# Patient Record
Sex: MALE | Race: WHITE
[De-identification: names, ages, dates, MRNs, and addresses within clinical notes are randomized per-mention and may not be internally consistent; named-entity substitution may affect disease eponyms.]

---

## 2018-02-12 ENCOUNTER — HOSPITAL ENCOUNTER (EMERGENCY)
Dept: HOSPITAL 61 - ER | Age: 33
Discharge: HOME | End: 2018-02-12
Payer: SELF-PAY

## 2018-02-12 DIAGNOSIS — Z88.0: ICD-10-CM

## 2018-02-12 DIAGNOSIS — F15.10: ICD-10-CM

## 2018-02-12 DIAGNOSIS — F42.4: ICD-10-CM

## 2018-02-12 DIAGNOSIS — L08.9: Primary | ICD-10-CM

## 2018-02-12 PROCEDURE — 99283 EMERGENCY DEPT VISIT LOW MDM: CPT

## 2018-02-12 PROCEDURE — 96372 THER/PROPH/DIAG INJ SC/IM: CPT

## 2018-02-12 RX ADMIN — IBUPROFEN 1 MG: 800 TABLET ORAL at 15:55

## 2018-02-12 RX ADMIN — CEFTRIAXONE 1 GM: 1 INJECTION, POWDER, FOR SOLUTION INTRAMUSCULAR; INTRAVENOUS at 15:25

## 2019-11-18 ENCOUNTER — HOSPITAL ENCOUNTER (EMERGENCY)
Dept: HOSPITAL 61 - ER | Age: 34
Discharge: HOME | End: 2019-11-18
Payer: SELF-PAY

## 2019-11-18 VITALS — HEIGHT: 67 IN | WEIGHT: 167 LBS | BODY MASS INDEX: 26.21 KG/M2

## 2019-11-18 VITALS — SYSTOLIC BLOOD PRESSURE: 137 MMHG | DIASTOLIC BLOOD PRESSURE: 97 MMHG

## 2019-11-18 DIAGNOSIS — Z88.0: ICD-10-CM

## 2019-11-18 DIAGNOSIS — K08.89: ICD-10-CM

## 2019-11-18 DIAGNOSIS — K02.9: Primary | ICD-10-CM

## 2019-11-18 PROCEDURE — 99281 EMR DPT VST MAYX REQ PHY/QHP: CPT
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
## 2022-04-16 ENCOUNTER — HOSPITAL ENCOUNTER (EMERGENCY)
Dept: HOSPITAL 61 - ER | Age: 37
Discharge: HOME | End: 2022-04-16
Payer: SELF-PAY

## 2022-04-16 VITALS — BODY MASS INDEX: 24.3 KG/M2 | HEIGHT: 70 IN | WEIGHT: 169.76 LBS

## 2022-04-16 VITALS — SYSTOLIC BLOOD PRESSURE: 141 MMHG | DIASTOLIC BLOOD PRESSURE: 81 MMHG

## 2022-04-16 DIAGNOSIS — S22.32XA: Primary | ICD-10-CM

## 2022-04-16 DIAGNOSIS — Z88.0: ICD-10-CM

## 2022-04-16 DIAGNOSIS — M54.2: ICD-10-CM
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
## 2022-04-16 DIAGNOSIS — R51.9: ICD-10-CM
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
## 2022-04-16 DIAGNOSIS — R10.9: ICD-10-CM

## 2022-04-16 DIAGNOSIS — K02.9: ICD-10-CM

## 2022-04-16 DIAGNOSIS — S01.81XA: ICD-10-CM

## 2022-04-16 DIAGNOSIS — Z87.891: ICD-10-CM
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
## 2022-04-16 LAB
ACETAMIN: < 2 MCG/ML (ref 10–30)
ALBUMIN SERPL-MCNC: 3.6 G/DL (ref 3.4–5)
ALBUMIN/GLOB SERPL: 0.8 {RATIO} (ref 1–1.7)
ALP SERPL-CCNC: 72 U/L (ref 46–116)
ALT SERPL-CCNC: 27 U/L (ref 16–63)
AMPHETAMINE/METHAMPHETAMINE: (no result)
ANION GAP SERPL CALC-SCNC: 9 MMOL/L (ref 6–14)
AST SERPL-CCNC: 23 U/L (ref 15–37)
BARBITURATES UR-MCNC: (no result) UG/ML
BASOPHILS # BLD AUTO: 0 X10^3/UL (ref 0–0.2)
BASOPHILS NFR BLD: 0 % (ref 0–3)
BENZODIAZ UR-MCNC: (no result) UG/L
BILIRUB SERPL-MCNC: 0.9 MG/DL (ref 0.2–1)
BUN SERPL-MCNC: 10 MG/DL (ref 8–26)
BUN/CREAT SERPL: 10 (ref 6–20)
CALCIUM SERPL-MCNC: 8.6 MG/DL (ref 8.5–10.1)
CANNABINOIDS UR-MCNC: (no result) UG/L
CHLORIDE SERPL-SCNC: 101 MMOL/L (ref 98–107)
CO2 SERPL-SCNC: 30 MMOL/L (ref 21–32)
COCAINE UR-MCNC: (no result) NG/ML
CREAT SERPL-MCNC: 1 MG/DL (ref 0.7–1.3)
EOSINOPHIL NFR BLD: 0.2 X10^3/UL (ref 0–0.7)
EOSINOPHIL NFR BLD: 3 % (ref 0–3)
ERYTHROCYTE [DISTWIDTH] IN BLOOD BY AUTOMATED COUNT: 13.1 % (ref 11.5–14.5)
ETHANOL SERPL-MCNC: 93 MG/DL (ref 0–10)
GFR SERPLBLD BASED ON 1.73 SQ M-ARVRAT: 84.5 ML/MIN
GLUCOSE SERPL-MCNC: 98 MG/DL (ref 70–99)
HCT VFR BLD CALC: 45.4 % (ref 39–53)
HGB BLD-MCNC: 16.1 G/DL (ref 13–17.5)
LYMPHOCYTES # BLD: 2.2 X10^3/UL (ref 1–4.8)
LYMPHOCYTES NFR BLD AUTO: 34 % (ref 24–48)
MAGNESIUM SERPL-MCNC: 2 MG/DL (ref 1.8–2.4)
MCH RBC QN AUTO: 34 PG (ref 25–35)
MCHC RBC AUTO-ENTMCNC: 35 G/DL (ref 31–37)
MCV RBC AUTO: 96 FL (ref 79–100)
METHADONE SERPL-MCNC: (no result) NG/ML
MONO #: 0.5 X10^3/UL (ref 0–1.1)
MONOCYTES NFR BLD: 8 % (ref 0–9)
NEUT #: 3.6 X10^3/UL (ref 1.8–7.7)
NEUTROPHILS NFR BLD AUTO: 55 % (ref 31–73)
OPIATES UR-MCNC: (no result) NG/ML
PCP SERPL-MCNC: (no result) MG/DL
PLATELET # BLD AUTO: 338 X10^3/UL (ref 140–400)
POTASSIUM SERPL-SCNC: 3 MMOL/L (ref 3.5–5.1)
PROT SERPL-MCNC: 7.9 G/DL (ref 6.4–8.2)
RBC # BLD AUTO: 4.75 X10^6/UL (ref 4.3–5.7)
SALIC: < 0.2 MG/DL (ref 2.8–20)
SODIUM SERPL-SCNC: 140 MMOL/L (ref 136–145)
WBC # BLD AUTO: 6.5 X10^3/UL (ref 4–11)

## 2022-04-16 PROCEDURE — 85025 COMPLETE CBC W/AUTO DIFF WBC: CPT

## 2022-04-16 PROCEDURE — 80053 COMPREHEN METABOLIC PANEL: CPT

## 2022-04-16 PROCEDURE — 99285 EMERGENCY DEPT VISIT HI MDM: CPT

## 2022-04-16 PROCEDURE — 12011 RPR F/E/E/N/L/M 2.5 CM/<: CPT

## 2022-04-16 PROCEDURE — 74177 CT ABD & PELVIS W/CONTRAST: CPT

## 2022-04-16 PROCEDURE — 83735 ASSAY OF MAGNESIUM: CPT

## 2022-04-16 PROCEDURE — 80329 ANALGESICS NON-OPIOID 1 OR 2: CPT

## 2022-04-16 PROCEDURE — 80307 DRUG TEST PRSMV CHEM ANLYZR: CPT

## 2022-04-16 PROCEDURE — G0480 DRUG TEST DEF 1-7 CLASSES: HCPCS

## 2022-04-16 PROCEDURE — 72125 CT NECK SPINE W/O DYE: CPT

## 2022-04-16 PROCEDURE — 71260 CT THORAX DX C+: CPT

## 2022-04-16 PROCEDURE — 70486 CT MAXILLOFACIAL W/O DYE: CPT

## 2022-04-16 PROCEDURE — 70450 CT HEAD/BRAIN W/O DYE: CPT

## 2022-04-16 PROCEDURE — 36415 COLL VENOUS BLD VENIPUNCTURE: CPT

## 2022-04-16 NOTE — RAD
Exam: CT of chest, abdomen and pelvis with contrast CT thoracic spine and lumbar spine



INDICATION: Fall off roof, left-sided chest pain



TECHNIQUE: Sequential axial images through the chest, abdomen and pelvis obtained following the admin
istration of 70 mL of Isovue-370 IV contrast. Sagittal and coronal reformatted images were reconstruc
benson from the axial data and reviewed. Cone-down reconstructed images of the thoracic spine were also 
reviewed



Exposure: One or more of the following in the visualized dose reduction techniques were utilized for 
this examination:

1.  Automated exposure control

2.  Adjustment of the MA and/or KV according to patient size

3.  Use of iterative of reconstructive technique



Comparisons: None



FINDINGS:

Visualized portions of the thyroid are unremarkable. No enlarged mediastinal lymph nodes.



Heart size is normal. No pericardial effusion. Thoracic aorta has normal course and caliber. Pulmonar
y artery is not enlarged.



Airways are patent. No consolidation or pneumothorax. Strandy opacities at dependent portion lungs li
adi representing atelectasis.



No pleural effusion or thickening.



Diffuse hepatic steatosis. Spleen, pancreas, gallbladder and adrenals are unremarkable.



No perinephric inflammation or hydronephrosis. No renal or ureteral calculi are identified.



Bladder is decompressed not well evaluated. Prostate is not enlarged.



Moderate amount of stool noted in the colon. Appendix is normal. No free intra-abdominal air or fluid
. No obstruction.



Abdominal aorta has normal course and caliber. Abdominal vasculature is patent.



No enlarged intra-abdominal lymph nodes are identified.



Mildly displaced fracture involving the fifth anterior left rib.



 Thoracic and lumbar spine:

Vertebral body heights and alignment are well-maintained.



Fracture to the thoracolumbar spine is not identified. No significant spondylotic change in the thora
columbar spine.



IMPRESSION:

1.  Mildly displaced fracture involving the left anterior fifth rib. No underlying pulmonary contusio
n or pneumothorax.

2.  No sequela of acute traumatic injury identified in the abdomen or pelvis.

3.  Negative CT thoracic and lumbar spine for acute traumatic injury.









Electronically signed by: Maribel Lee MD (4/16/2022 9:43 PM) [facility]
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
## 2022-04-16 NOTE — RAD
Exam: CT of chest, abdomen and pelvis with contrast CT thoracic spine and lumbar spine



INDICATION: Fall off roof, left-sided chest pain



TECHNIQUE: Sequential axial images through the chest, abdomen and pelvis obtained following the admin
istration of 70 mL of Isovue-370 IV contrast. Sagittal and coronal reformatted images were reconstruc
benson from the axial data and reviewed. Cone-down reconstructed images of the thoracic spine were also 
reviewed



Exposure: One or more of the following in the visualized dose reduction techniques were utilized for 
this examination:

1.  Automated exposure control

2.  Adjustment of the MA and/or KV according to patient size

3.  Use of iterative of reconstructive technique



Comparisons: None



FINDINGS:

Visualized portions of the thyroid are unremarkable. No enlarged mediastinal lymph nodes.



Heart size is normal. No pericardial effusion. Thoracic aorta has normal course and caliber. Pulmonar
y artery is not enlarged.



Airways are patent. No consolidation or pneumothorax. Strandy opacities at dependent portion lungs li
adi representing atelectasis.



No pleural effusion or thickening.



Diffuse hepatic steatosis. Spleen, pancreas, gallbladder and adrenals are unremarkable.



No perinephric inflammation or hydronephrosis. No renal or ureteral calculi are identified.



Bladder is decompressed not well evaluated. Prostate is not enlarged.



Moderate amount of stool noted in the colon. Appendix is normal. No free intra-abdominal air or fluid
. No obstruction.



Abdominal aorta has normal course and caliber. Abdominal vasculature is patent.



No enlarged intra-abdominal lymph nodes are identified.



Mildly displaced fracture involving the fifth anterior left rib.



 Thoracic and lumbar spine:

Vertebral body heights and alignment are well-maintained.



Fracture to the thoracolumbar spine is not identified. No significant spondylotic change in the thora
columbar spine.



IMPRESSION:

1.  Mildly displaced fracture involving the left anterior fifth rib. No underlying pulmonary contusio
n or pneumothorax.

2.  No sequela of acute traumatic injury identified in the abdomen or pelvis.

3.  Negative CT thoracic and lumbar spine for acute traumatic injury.









Electronically signed by: Maribel Lee MD (4/16/2022 9:43 PM) [facility]

## 2022-04-16 NOTE — RAD
Exam: CT head, maxillofacial and cervical spine



INDICATION: Fall, head injury



TECHNIQUE: Sequential axial images through the head, neck facial and cervical spine were obtained wit
hout the administration of IV contrast.



Exposure: One or more of the following in the visualized dose reduction techniques were utilized for 
this examination:

1.  Automated exposure control

2.  Adjustment of the MA and/or KV according to patient size

3.  Use of iterative of reconstructive technique





Comparisons: None



FINDINGS:



Head:

No focal parenchymal lesion or hemorrhage is identified. There is no midline shift or sulcal effaceme
nt.



No acute vascular territory infarction is identified. Gray-white distinction is preserved.



The ventricular system is within normal limits without compression hydrocephalus. The basal cisterns 
are well maintained.



Face:

Mild extra cranial soft tissue scalp contusion overlying the left superior orbital ridge. The underly
ing globe and intraorbital contents are normal. Mucosal thickening of the left maxillary sinus.. No a
cute fractures.



Cervical spine:

Vertebral body heights and alignment are well-maintained.



Fracture to the cervical spine is is not identified.



Mild spondylotic change in cervical spine with degenerative disc disease greatest at C5-C6 and C6-C7.




Visualized paraspinal soft tissues are unremarkable



IMPRESSION:

1.  No acute intracranial abnormality.

2.  Mild extra cranial soft tissue scalp contusion overlying the left superior ridge without underlyi
ng osseous abnormality.

3.  Negative CT C-spine for intracranial abnormality.



Electronically signed by: Maribel Lee MD (4/16/2022 9:19 PM) [facility]

## 2022-04-16 NOTE — RAD
Exam: CT of chest, abdomen and pelvis with contrast CT thoracic spine and lumbar spine



INDICATION: Fall off roof, left-sided chest pain



TECHNIQUE: Sequential axial images through the chest, abdomen and pelvis obtained following the admin
istration of 70 mL of Isovue-370 IV contrast. Sagittal and coronal reformatted images were reconstruc
benson from the axial data and reviewed. Cone-down reconstructed images of the thoracic spine were also 
reviewed



Exposure: One or more of the following in the visualized dose reduction techniques were utilized for 
this examination:

1.  Automated exposure control

2.  Adjustment of the MA and/or KV according to patient size

3.  Use of iterative of reconstructive technique



Comparisons: None



FINDINGS:

Visualized portions of the thyroid are unremarkable. No enlarged mediastinal lymph nodes.



Heart size is normal. No pericardial effusion. Thoracic aorta has normal course and caliber. Pulmonar
y artery is not enlarged.



Airways are patent. No consolidation or pneumothorax. Strandy opacities at dependent portion lungs li
adi representing atelectasis.



No pleural effusion or thickening.



Diffuse hepatic steatosis. Spleen, pancreas, gallbladder and adrenals are unremarkable.



No perinephric inflammation or hydronephrosis. No renal or ureteral calculi are identified.



Bladder is decompressed not well evaluated. Prostate is not enlarged.



Moderate amount of stool noted in the colon. Appendix is normal. No free intra-abdominal air or fluid
. No obstruction.



Abdominal aorta has normal course and caliber. Abdominal vasculature is patent.



No enlarged intra-abdominal lymph nodes are identified.



Mildly displaced fracture involving the fifth anterior left rib.



 Thoracic and lumbar spine:

Vertebral body heights and alignment are well-maintained.



Fracture to the thoracolumbar spine is not identified. No significant spondylotic change in the thora
columbar spine.



IMPRESSION:

1.  Mildly displaced fracture involving the left anterior fifth rib. No underlying pulmonary contusio
n or pneumothorax.

2.  No sequela of acute traumatic injury identified in the abdomen or pelvis.

3.  Negative CT thoracic and lumbar spine for acute traumatic injury.









Electronically signed by: Maribel Lee MD (4/16/2022 9:43 PM) [facility]

## 2022-04-16 NOTE — RAD
Exam: CT head, maxillofacial and cervical spine



INDICATION: Fall, head injury



TECHNIQUE: Sequential axial images through the head, neck facial and cervical spine were obtained wit
hout the administration of IV contrast.



Exposure: One or more of the following in the visualized dose reduction techniques were utilized for 
this examination:

1.  Automated exposure control

2.  Adjustment of the MA and/or KV according to patient size

3.  Use of iterative of reconstructive technique





Comparisons: None



FINDINGS:



Head:

No focal parenchymal lesion or hemorrhage is identified. There is no midline shift or sulcal effaceme
nt.



No acute vascular territory infarction is identified. Gray-white distinction is preserved.



The ventricular system is within normal limits without compression hydrocephalus. The basal cisterns 
are well maintained.



Face:

Mild extra cranial soft tissue scalp contusion overlying the left superior orbital ridge. The underly
ing globe and intraorbital contents are normal. Mucosal thickening of the left maxillary sinus.. No a
cute fractures.



Cervical spine:

Vertebral body heights and alignment are well-maintained.



Fracture to the cervical spine is is not identified.



Mild spondylotic change in cervical spine with degenerative disc disease greatest at C5-C6 and C6-C7.




Visualized paraspinal soft tissues are unremarkable



IMPRESSION:

1.  No acute intracranial abnormality.

2.  Mild extra cranial soft tissue scalp contusion overlying the left superior ridge without underlyi
ng osseous abnormality.

3.  Negative CT C-spine for intracranial abnormality.



Electronically signed by: Maribel Lee MD (4/16/2022 9:19 PM) [facility]